# Patient Record
Sex: FEMALE | Race: WHITE
[De-identification: names, ages, dates, MRNs, and addresses within clinical notes are randomized per-mention and may not be internally consistent; named-entity substitution may affect disease eponyms.]

---

## 2019-03-15 ENCOUNTER — HOSPITAL ENCOUNTER (EMERGENCY)
Dept: HOSPITAL 5 - ED | Age: 19
LOS: 1 days | Discharge: HOME | End: 2019-03-16
Payer: SELF-PAY

## 2019-03-15 VITALS — SYSTOLIC BLOOD PRESSURE: 136 MMHG | DIASTOLIC BLOOD PRESSURE: 86 MMHG

## 2019-03-15 DIAGNOSIS — H66.92: Primary | ICD-10-CM

## 2019-03-15 PROCEDURE — 99282 EMERGENCY DEPT VISIT SF MDM: CPT

## 2019-03-16 NOTE — EMERGENCY DEPARTMENT REPORT
ED ENT HPI





- General


Chief complaint: Earache


Stated complaint: LEFT EAR PAIN


Time Seen by Provider: 19 00:08


Source: patient


Mode of arrival: Ambulatory


Limitations: No Limitations





- History of Present Illness


Initial comments: 





pt is s 17 y/o female who presents for left earache x 1 week 5/10 aching 

soreness exacerbated by movement relieved by nothing pt has not recorded MD Ignacia complaint: ear pain


Onset/Timin


-: week(s)


Location: R ear, L ear


Severity: moderate


Severity scale (0 -10): 5


Quality: aching


Consistency: constant


Improves with: none


Worsens with: position, movement


Associated Symptoms: fever, tinnitus





- Related Data


                                  Previous Rx's











 Medication  Instructions  Recorded  Last Taken  Type


 


Amoxicillin 500 mg PO TID 10 Days #30 capsule 19 Unknown Rx


 


Ibuprofen 800 mg PO TID PRN #30 tablet 19 Unknown Rx











                                    Allergies











Allergy/AdvReac Type Severity Reaction Status Date / Time


 


No Known Allergies Allergy   Unverified 03/15/19 21:37














ED Dental HPI





- General


Chief complaint: Earache


Stated complaint: LEFT EAR PAIN


Time Seen by Provider: 19 00:08


Source: patient


Mode of arrival: Ambulatory


Limitations: No Limitations





- Related Data


                                  Previous Rx's











 Medication  Instructions  Recorded  Last Taken  Type


 


Amoxicillin 500 mg PO TID 10 Days #30 capsule 19 Unknown Rx


 


Ibuprofen 800 mg PO TID PRN #30 tablet 19 Unknown Rx











                                    Allergies











Allergy/AdvReac Type Severity Reaction Status Date / Time


 


No Known Allergies Allergy   Unverified 03/15/19 21:37














ED Review of Systems


ROS: 


Stated complaint: LEFT EAR PAIN


Other details as noted in HPI





Constitutional: fever.  denies: chills


Eyes: denies: eye pain, eye discharge, vision change


ENT: ear pain.  denies: throat pain


Respiratory: denies: cough, shortness of breath, wheezing


Cardiovascular: denies: chest pain, palpitations


Endocrine: no symptoms reported


Gastrointestinal: denies: abdominal pain, nausea, diarrhea


Genitourinary: denies: urgency, dysuria, discharge


Musculoskeletal: denies: back pain, joint swelling, arthralgia


Skin: denies: rash, lesions


Neurological: denies: headache, weakness, paresthesias


Psychiatric: denies: anxiety, depression


Hematological/Lymphatic: denies: easy bleeding, easy bruising





ED Past Medical Hx





- Past Medical History


Previous Medical History?: No





- Surgical History


Past Surgical History?: No





- Social History


Smoking Status: Never Smoker


Substance Use Type: None





- Medications


Home Medications: 


                                Home Medications











 Medication  Instructions  Recorded  Confirmed  Last Taken  Type


 


Amoxicillin 500 mg PO TID 10 Days #30 capsule 19  Unknown Rx


 


Ibuprofen 800 mg PO TID PRN #30 tablet 19  Unknown Rx














ED Physical Exam





- General


Limitations: No Limitations


General appearance: alert, in no apparent distress





- Head


Head exam: Present: atraumatic, normocephalic





- Eye


Eye exam: Present: PERRL, EOMI


Pupils: Present: normal accommodation





- ENT


ENT exam: Present: mucous membranes moist





- Expanded ENT Exam


  ** Expanded


Ear exam: Present: normal external inspection


TM/Canal exam: Erythema: Right TM, Left TM, Canal Tenderness: Left TM, Right TM


Mouth exam: Absent: trismus


Teeth exam: Present: normal inspection


Throat exam: Positive: normal inspection





- Neck


Neck exam: Present: normal inspection, full ROM, lymphadenopathy.  Absent: 

tenderness, meningismus, thyromegaly





- Respiratory


Respiratory exam: Present: normal lung sounds bilaterally.  Absent: respiratory 

distress





- Cardiovascular


Cardiovascular Exam: Present: regular rate, normal rhythm.  Absent: systolic 

murmur, diastolic murmur, rubs, gallop





- GI/Abdominal


GI/Abdominal exam: Present: soft, normal bowel sounds





- Rectal


Rectal exam: Present: deferred





- Extremities Exam


Extremities exam: Present: normal inspection





- Back Exam


Back exam: Present: normal inspection





- Neurological Exam


Neurological exam: Present: alert, oriented X3, CN II-XII intact, normal gait, 

reflexes normal





- Psychiatric


Psychiatric exam: Present: normal affect, normal mood





- Skin


Skin exam: Present: warm, dry, intact, normal color.  Absent: rash





ED Course





                                   Vital Signs











  03/15/19 03/15/19





  21:38 22:14


 


Temperature 98.4 F 98.4 F


 


Pulse Rate 87 94 H


 


Respiratory 16 16





Rate  


 


Blood Pressure 136/86 136/86


 


O2 Sat by Pulse 99 99





Oximetry  














ED Medical Decision Making





- Medical Decision Making


this is AOM plan: amoxicillin ibuprofen, follow up with pcp in 2-3 days return 

to ed if symptoms worsen pt verbalized agreement and understanding of discharge 

plan. 





Critical care attestation.: 


If time is entered above; I have spent that time in minutes in the direct care 

of this critically ill patient, excluding procedure time.








ED Disposition


Clinical Impression: 


AOM (acute otitis media)


Qualifiers:


 Otitis media type: serous Laterality: bilateral Recurrence: non-recurrent 

Qualified Code(s): H65.03 - Acute serous otitis media, bilateral





Disposition:  TO HOME OR SELFCARE


Is pt being admited?: No


Does the pt Need Aspirin: No


Condition: Stable


Instructions:  Otitis Media (ED)


Prescriptions: 


Amoxicillin 500 mg PO TID 10 Days #30 capsule


Ibuprofen 800 mg PO TID PRN #30 tablet


 PRN Reason: pain


Referrals: 


[facility], [place] [Primary Care Provider] - 3-5 Days


Forms:  Work/School Release Form(ED)


Time of Disposition: 00:34